# Patient Record
Sex: FEMALE | Race: WHITE | NOT HISPANIC OR LATINO | Employment: FULL TIME | URBAN - METROPOLITAN AREA
[De-identification: names, ages, dates, MRNs, and addresses within clinical notes are randomized per-mention and may not be internally consistent; named-entity substitution may affect disease eponyms.]

---

## 2020-10-05 ENCOUNTER — OFFICE VISIT (OUTPATIENT)
Dept: FAMILY MEDICINE CLINIC | Facility: CLINIC | Age: 25
End: 2020-10-05

## 2020-10-05 VITALS
HEART RATE: 70 BPM | DIASTOLIC BLOOD PRESSURE: 58 MMHG | SYSTOLIC BLOOD PRESSURE: 102 MMHG | WEIGHT: 125 LBS | BODY MASS INDEX: 20.09 KG/M2 | HEIGHT: 66 IN | TEMPERATURE: 98 F | OXYGEN SATURATION: 98 % | RESPIRATION RATE: 14 BRPM

## 2020-10-05 DIAGNOSIS — Z00.00 ANNUAL PHYSICAL EXAM: Primary | ICD-10-CM

## 2020-10-05 DIAGNOSIS — Z23 NEED FOR TDAP VACCINATION: ICD-10-CM

## 2020-10-05 PROCEDURE — 90715 TDAP VACCINE 7 YRS/> IM: CPT | Performed by: FAMILY MEDICINE

## 2020-10-05 PROCEDURE — 99385 PREV VISIT NEW AGE 18-39: CPT | Performed by: FAMILY MEDICINE

## 2020-10-05 PROCEDURE — 90471 IMMUNIZATION ADMIN: CPT | Performed by: FAMILY MEDICINE

## 2020-10-05 RX ORDER — DROSPIRENONE AND ETHINYL ESTRADIOL TABLETS 0.02-3(28)
KIT ORAL SEE ADMIN INSTRUCTIONS
COMMUNITY
Start: 2020-08-31 | End: 2020-12-07 | Stop reason: SDUPTHER

## 2020-10-05 NOTE — PROGRESS NOTES
Subjective   Violeta Chin is a 25 y.o. female.   Chief Complaint   Patient presents with   • Establish Care   • TDAP     History of Present Illness   LMP: 9/5/2020, she does take OCP.   Pap completed in 2020, thinks June 5.   Dental exam: overdue   Vision exam: June 2020   She exercises daily and has healthy diet.   She moved from [place]. She is an  at [employer].     The following portions of the patient's history were reviewed and updated as appropriate: allergies, current medications, past family history, past medical history, past social history, past surgical history and problem list.    Review of Systems   Constitutional: Negative for activity change, appetite change and fever.   HENT: Negative.    Respiratory: Negative.    Cardiovascular: Negative.    Endocrine: Negative.    Genitourinary: Negative.    Skin: Negative for rash.   Neurological: Negative.    Psychiatric/Behavioral: Negative.        Objective   Physical Exam  Vitals signs and nursing note reviewed.   Constitutional:       Appearance: She is well-developed.   HENT:      Head: Normocephalic and atraumatic.      Right Ear: Tympanic membrane, ear canal and external ear normal.      Left Ear: Tympanic membrane, ear canal and external ear normal.      Nose: Nose normal.   Eyes:      Conjunctiva/sclera: Conjunctivae normal.   Neck:      Musculoskeletal: Normal range of motion and neck supple.   Cardiovascular:      Rate and Rhythm: Normal rate and regular rhythm.      Heart sounds: Normal heart sounds. No murmur.   Pulmonary:      Effort: Pulmonary effort is normal.      Breath sounds: Normal breath sounds. No wheezing.   Musculoskeletal:         General: No deformity.   Lymphadenopathy:      Cervical: No cervical adenopathy.   Skin:     General: Skin is warm and dry.   Neurological:      General: No focal deficit present.      Mental Status: She is alert and oriented to person, place, and time.   Psychiatric:         Mood and Affect: Mood normal.          Behavior: Behavior normal.         Thought Content: Thought content normal.           Assessment/Plan   Violeta was seen today for establish care and tdap.    Diagnoses and all orders for this visit:    Annual physical exam    Need for Tdap vaccination  -     Tdap Vaccine Greater Than or Equal To 6yo IM      Health advice: healthy food choices with fresh fruits and vegetables, maintain sleep pattern at least 8 hours, avoid texting and distracted driving practices; wear safety belt, engage in regular exercise, maintain healthy weight, use safe sex practices, avoid alcohol and illicit drugs. Maintain immunizations that are up to date. Maintain health maintenance: Pap, etc.  Follow up with PCP if struggling with depression or anxiety. Keep regular dental and eye exams. Brush and floss teeth daily.   F/U annually and prn.

## 2020-12-07 RX ORDER — DROSPIRENONE AND ETHINYL ESTRADIOL TABLETS 0.02-3(28)
1 KIT ORAL SEE ADMIN INSTRUCTIONS
Qty: 84 TABLET | Refills: 3 | Status: SHIPPED | OUTPATIENT
Start: 2020-12-07 | End: 2021-12-09

## 2020-12-07 NOTE — TELEPHONE ENCOUNTER
Caller: Violeta Chin    Relationship: Self    Best call back number:[phone]     Medication needed:   Requested Prescriptions     Pending Prescriptions Disp Refills   • Loryna 3-0.02 MG per tablet       Sig: See Admin Instructions.       When do you need the refill by: WITHIN THREE DAYS    What details did the patient provide when requesting the medication: PATIENT HAS THREE PILLS LEFT    Does the patient have less than a 3 day supply:  [] Yes  [x] No    What is the patient's preferred pharmacy: [place] [address] [phone] [facility] [phone]

## 2021-03-11 RX ORDER — DROSPIRENONE AND ETHINYL ESTRADIOL TABLETS 0.02-3(28)
1 KIT ORAL SEE ADMIN INSTRUCTIONS
Qty: 84 TABLET | Refills: 3 | OUTPATIENT
Start: 2021-03-11

## 2021-09-22 ENCOUNTER — OFFICE VISIT (OUTPATIENT)
Dept: FAMILY MEDICINE CLINIC | Facility: CLINIC | Age: 26
End: 2021-09-22

## 2021-09-22 VITALS
TEMPERATURE: 98.1 F | HEART RATE: 78 BPM | BODY MASS INDEX: 19.29 KG/M2 | SYSTOLIC BLOOD PRESSURE: 96 MMHG | WEIGHT: 120 LBS | DIASTOLIC BLOOD PRESSURE: 60 MMHG | RESPIRATION RATE: 18 BRPM | OXYGEN SATURATION: 99 % | HEIGHT: 66 IN

## 2021-09-22 DIAGNOSIS — Z13.220 SCREENING FOR HYPERLIPIDEMIA: ICD-10-CM

## 2021-09-22 DIAGNOSIS — Z00.00 ANNUAL PHYSICAL EXAM: Primary | ICD-10-CM

## 2021-09-22 PROCEDURE — 99395 PREV VISIT EST AGE 18-39: CPT | Performed by: FAMILY MEDICINE

## 2021-09-22 NOTE — PROGRESS NOTES
"Chief Complaint  Annual Exam no pap (pt is not fasting )    Subjective          Violeta Chin presents to [facility]  History of Present Illness  Here for annual exam  Dental exam is up to date  Vision exam is up to date  Pap smear completed June 2020, plans to establish care with gynecology   Taking OCP to manage painful periods  She is active, tries to do yoga, run, strength training.     The following portions of the patient's history were reviewed and updated as appropriate: allergies, current medications, past family history, past medical history, past social history, past surgical history and problem list.    Objective   Vital Signs:   BP 96/60   Pulse 78   Temp 98.1 °F (36.7 °C)   Resp 18   Ht 167.6 cm (66\")   Wt 54.4 kg (120 lb)   SpO2 99%   BMI 19.37 kg/m²     Physical Exam  Vitals and nursing note reviewed.   Constitutional:       Appearance: Normal appearance. She is well-developed.   HENT:      Head: Normocephalic and atraumatic.      Right Ear: External ear normal.      Left Ear: External ear normal.      Nose: Nose normal.   Eyes:      Conjunctiva/sclera: Conjunctivae normal.   Cardiovascular:      Rate and Rhythm: Normal rate and regular rhythm.      Heart sounds: Normal heart sounds. No murmur heard.     Pulmonary:      Effort: Pulmonary effort is normal.      Breath sounds: Normal breath sounds.   Musculoskeletal:         General: No deformity.      Cervical back: Neck supple.   Skin:     General: Skin is warm and dry.   Neurological:      General: No focal deficit present.      Mental Status: She is alert and oriented to person, place, and time.   Psychiatric:         Mood and Affect: Mood normal.         Behavior: Behavior normal.         Thought Content: Thought content normal.        Result Review :                 Assessment and Plan    Diagnoses and all orders for this visit:    1. Annual physical exam (Primary)  -     CBC & Differential; Future  -     " Comprehensive Metabolic Panel; Future  -     Lipid Panel With / Chol / HDL Ratio; Future    2. Screening for hyperlipidemia  -     CBC & Differential; Future  -     Comprehensive Metabolic Panel; Future  -     Lipid Panel With / Chol / HDL Ratio; Future    Health advice: healthy food choices with fresh fruits and vegetables, maintain sleep pattern at least 8 hours, avoid texting and distracted driving practices; wear safety belt, engage in regular exercise, maintain healthy weight, use safe sex practices, avoid alcohol and illicit drugs. Maintain immunizations that are up to date. Maintain health maintenance:  Pap, etc.  Follow up with PCP if struggling with depression or anxiety. Keep regular dental and eye exams. Brush and floss teeth daily.   F/U annually and prn.       Follow Up   Return in about 1 year (around 9/22/2022) for Annual.  Patient was given instructions and counseling regarding her condition or for health maintenance advice. Please see specific information pulled into the AVS if appropriate.

## 2021-09-22 NOTE — PATIENT INSTRUCTIONS
Preventive Care 21-39 Years Old, Female  Preventive care refers to lifestyle choices and visits with your health care provider that can promote health and wellness. This includes:  · A yearly physical exam. This is also called an annual wellness visit.  · Regular dental and eye exams.  · Immunizations.  · Screening for certain conditions.  · Healthy lifestyle choices, such as:  ? Eating a healthy diet.  ? Getting regular exercise.  ? Not using drugs or products that contain nicotine and tobacco.  ? Limiting alcohol use.  What can I expect for my preventive care visit?  Physical exam  Your health care provider may check your:  · Height and weight. These may be used to calculate your BMI (body mass index). BMI is a measurement that tells if you are at a healthy weight.  · Heart rate and blood pressure.  · Body temperature.  · Skin for abnormal spots.  Counseling  Your health care provider may ask you questions about your:  · Past medical problems.  · Family's medical history.  · Alcohol, tobacco, and drug use.  · Emotional well-being.  · Home life and relationship well-being.  · Sexual activity.  · Diet, exercise, and sleep habits.  · Work and work environment.  · Access to firearms.  · Method of birth control.  · Menstrual cycle.  · Pregnancy history.  What immunizations do I need?    Vaccines are usually given at various ages, according to a schedule. Your health care provider will recommend vaccines for you based on your age, medical history, and lifestyle or other factors, such as travel or where you work.  What tests do I need?    Blood tests  · Lipid and cholesterol levels. These may be checked every 5 years starting at age 20.  · Hepatitis C test.  · Hepatitis B test.  Screening  · Diabetes screening. This is done by checking your blood sugar (glucose) after you have not eaten for a while (fasting).  · STD (sexually transmitted disease) testing, if you are at risk.  · BRCA-related cancer screening. This may be  done if you have a family history of breast, ovarian, tubal, or peritoneal cancers.  · Pelvic exam and Pap test. This may be done every 3 years starting at age 21. Starting at age 30, this may be done every 5 years if you have a Pap test in combination with an HPV test.  Talk with your health care provider about your test results, treatment options, and if necessary, the need for more tests.  Follow these instructions at home:  Eating and drinking    · Eat a healthy diet that includes fresh fruits and vegetables, whole grains, lean protein, and low-fat dairy products.  · Take vitamin and mineral supplements as recommended by your health care provider.  · Do not drink alcohol if:  ? Your health care provider tells you not to drink.  ? You are pregnant, may be pregnant, or are planning to become pregnant.  · If you drink alcohol:  ? Limit how much you have to 0-1 drink a day.  ? Be aware of how much alcohol is in your drink. In the [place] one drink equals one 12 oz bottle of beer (355 mL), one 5 oz glass of wine (148 mL), or one 1½ oz glass of hard liquor (44 mL).    Lifestyle  · Take daily care of your teeth and gums. Brush your teeth every morning and night with fluoride toothpaste. Floss one time each day.  · Stay active. Exercise for at least 30 minutes 5 or more days each week.  · Do not use any products that contain nicotine or tobacco, such as cigarettes, e-cigarettes, and chewing tobacco. If you need help quitting, ask your health care provider.  · Do not use drugs.  · If you are sexually active, practice safe sex. Use a condom or other form of birth control (contraception) in order to prevent pregnancy and STIs (sexually transmitted infections). If you plan to become pregnant, see your health care provider for a pre-conception visit.  · Find healthy ways to cope with stress, such as:  ? Meditation, yoga, or listening to music.  ? Journaling.  ? Talking to a trusted person.  ? Spending time with friends and  family.  Safety  · Always wear your seat belt while driving or riding in a vehicle.  · Do not drive:  ? If you have been drinking alcohol. Do not ride with someone who has been drinking.  ? When you are tired or distracted.  ? While texting.  · Wear a helmet and other protective equipment during sports activities.  · If you have firearms in your house, make sure you follow all gun safety procedures.  · Seek help if you have been physically or sexually abused.  What's next?  · Go to your health care provider once a year for an annual wellness visit.  · Ask your health care provider how often you should have your eyes and teeth checked.  · Stay up to date on all vaccines.  This information is not intended to replace advice given to you by your health care provider. Make sure you discuss any questions you have with your health care provider.  Document Revised: 09/02/2020 Document Reviewed: 08/29/2019  [employer] Patient Education © 2021 [employer]

## 2021-09-30 ENCOUNTER — LAB (OUTPATIENT)
Dept: FAMILY MEDICINE CLINIC | Facility: CLINIC | Age: 26
End: 2021-09-30

## 2021-09-30 DIAGNOSIS — Z13.220 SCREENING FOR HYPERLIPIDEMIA: ICD-10-CM

## 2021-09-30 DIAGNOSIS — Z00.00 ANNUAL PHYSICAL EXAM: ICD-10-CM

## 2021-10-01 LAB
ALBUMIN SERPL-MCNC: 4.4 G/DL (ref 3.5–5.2)
ALBUMIN/GLOB SERPL: 1.8 G/DL
ALP SERPL-CCNC: 36 U/L (ref 39–117)
ALT SERPL-CCNC: 13 U/L (ref 1–33)
AST SERPL-CCNC: 16 U/L (ref 1–32)
BASOPHILS # BLD AUTO: 0.03 10*3/MM3 (ref 0–0.2)
BASOPHILS NFR BLD AUTO: 0.6 % (ref 0–1.5)
BILIRUB SERPL-MCNC: 0.3 MG/DL (ref 0–1.2)
BUN SERPL-MCNC: 16 MG/DL (ref 6–20)
BUN/CREAT SERPL: 20 (ref 7–25)
CALCIUM SERPL-MCNC: 9.6 MG/DL (ref 8.6–10.5)
CHLORIDE SERPL-SCNC: 106 MMOL/L (ref 98–107)
CHOLEST SERPL-MCNC: 178 MG/DL (ref 0–200)
CHOLEST/HDLC SERPL: 3.18 {RATIO}
CO2 SERPL-SCNC: 24.7 MMOL/L (ref 22–29)
CREAT SERPL-MCNC: 0.8 MG/DL (ref 0.57–1)
EOSINOPHIL # BLD AUTO: 0.09 10*3/MM3 (ref 0–0.4)
EOSINOPHIL NFR BLD AUTO: 1.7 % (ref 0.3–6.2)
ERYTHROCYTE [DISTWIDTH] IN BLOOD BY AUTOMATED COUNT: 12.3 % (ref 12.3–15.4)
GLOBULIN SER CALC-MCNC: 2.4 GM/DL
GLUCOSE SERPL-MCNC: 80 MG/DL (ref 65–99)
HCT VFR BLD AUTO: 40.2 % (ref 34–46.6)
HDLC SERPL-MCNC: 56 MG/DL (ref 40–60)
HGB BLD-MCNC: 13.2 G/DL (ref 12–15.9)
IMM GRANULOCYTES # BLD AUTO: 0.01 10*3/MM3 (ref 0–0.05)
IMM GRANULOCYTES NFR BLD AUTO: 0.2 % (ref 0–0.5)
LDLC SERPL CALC-MCNC: 101 MG/DL (ref 0–100)
LYMPHOCYTES # BLD AUTO: 1.82 10*3/MM3 (ref 0.7–3.1)
LYMPHOCYTES NFR BLD AUTO: 34.8 % (ref 19.6–45.3)
MCH RBC QN AUTO: 30.6 PG (ref 26.6–33)
MCHC RBC AUTO-ENTMCNC: 32.8 G/DL (ref 31.5–35.7)
MCV RBC AUTO: 93.3 FL (ref 79–97)
MONOCYTES # BLD AUTO: 0.41 10*3/MM3 (ref 0.1–0.9)
MONOCYTES NFR BLD AUTO: 7.8 % (ref 5–12)
NEUTROPHILS # BLD AUTO: 2.87 10*3/MM3 (ref 1.7–7)
NEUTROPHILS NFR BLD AUTO: 54.9 % (ref 42.7–76)
NRBC BLD AUTO-RTO: 0 /100 WBC (ref 0–0.2)
PLATELET # BLD AUTO: 216 10*3/MM3 (ref 140–450)
POTASSIUM SERPL-SCNC: 4.8 MMOL/L (ref 3.5–5.2)
PROT SERPL-MCNC: 6.8 G/DL (ref 6–8.5)
RBC # BLD AUTO: 4.31 10*6/MM3 (ref 3.77–5.28)
SODIUM SERPL-SCNC: 140 MMOL/L (ref 136–145)
TRIGL SERPL-MCNC: 115 MG/DL (ref 0–150)
VLDLC SERPL CALC-MCNC: 21 MG/DL (ref 5–40)
WBC # BLD AUTO: 5.23 10*3/MM3 (ref 3.4–10.8)

## 2021-12-09 RX ORDER — DROSPIRENONE AND ETHINYL ESTRADIOL TABLETS 0.02-3(28)
KIT ORAL
Qty: 84 TABLET | Refills: 0 | Status: SHIPPED | OUTPATIENT
Start: 2021-12-09 | End: 2022-03-14

## 2022-03-14 RX ORDER — DROSPIRENONE AND ETHINYL ESTRADIOL TABLETS 0.02-3(28)
KIT ORAL
Qty: 84 TABLET | Refills: 0 | Status: SHIPPED | OUTPATIENT
Start: 2022-03-14 | End: 2022-06-24

## 2022-06-09 ENCOUNTER — TELEPHONE (OUTPATIENT)
Dept: FAMILY MEDICINE CLINIC | Facility: CLINIC | Age: 27
End: 2022-06-09

## 2022-06-09 NOTE — TELEPHONE ENCOUNTER
Caller: Violeta Chin    Relationship to patient: Self    Best call back number: [phone]     Chief complaint: RASH ON FACE AND BODY    Type of visit: OFFICE    Requested date: AS SOON AS POSSIBLE    Additional note PATIENT IS WILLING TO SEE ANYONE.

## 2022-06-24 RX ORDER — DROSPIRENONE AND ETHINYL ESTRADIOL TABLETS 0.02-3(28)
KIT ORAL
Qty: 84 TABLET | Refills: 0 | Status: SHIPPED | OUTPATIENT
Start: 2022-06-24 | End: 2022-07-13 | Stop reason: SDUPTHER

## 2022-07-13 ENCOUNTER — OFFICE VISIT (OUTPATIENT)
Dept: OBSTETRICS AND GYNECOLOGY | Facility: CLINIC | Age: 27
End: 2022-07-13

## 2022-07-13 VITALS
SYSTOLIC BLOOD PRESSURE: 114 MMHG | DIASTOLIC BLOOD PRESSURE: 82 MMHG | BODY MASS INDEX: 20.76 KG/M2 | HEIGHT: 66 IN | WEIGHT: 129.2 LBS

## 2022-07-13 DIAGNOSIS — Z30.41 ENCOUNTER FOR SURVEILLANCE OF CONTRACEPTIVE PILLS: ICD-10-CM

## 2022-07-13 DIAGNOSIS — Z01.419 WOMEN'S ANNUAL ROUTINE GYNECOLOGICAL EXAMINATION: Primary | ICD-10-CM

## 2022-07-13 PROCEDURE — 99385 PREV VISIT NEW AGE 18-39: CPT | Performed by: OBSTETRICS & GYNECOLOGY

## 2022-07-13 RX ORDER — DROSPIRENONE AND ETHINYL ESTRADIOL TABLETS 0.02-3(28)
1 KIT ORAL DAILY
Qty: 84 TABLET | Refills: 3 | Status: SHIPPED | OUTPATIENT
Start: 2022-07-13 | End: 2022-12-02

## 2022-07-13 NOTE — PROGRESS NOTES
GYN Annual Exam     CC - Here for annual exam.        HPI  Violeta Chin is a 27 y.o. female, , who presents for annual well woman exam and to establish care. Patient's last menstrual period was 2022. Periods are regular every 25-35 days, lasting 4-5 days. .  Dysmenorrhea:none.  Patient reports problems with: none. Partner Status: Marital Status: .  She is sexually active. She has not had new partners since her last STD testing. She does desire STD testing. STD testing recommendations have been explained to the patient. STD testing recommendations have been explained to the patient and she does desire STD testing.    Additional OB/GYN History   Current contraception: contraceptive methods: OCP (estrogen/progesterone)  Desires to: continue contraception  Last Pap : 2020. Result: Normal per patient - Completed in [place]   Last Completed Pap Smear          Ordered - PAP SMEAR (Every 3 Years) Ordered on 2020  Patient-Reported (Performed Externally)              History of abnormal Pap smear: no  Family history of uterine, colon, breast, or ovarian cancer: no  Performs monthly Self-Breast Exam: no  Exercises Regularly:yes  Feelings of Anxiety or Depression: no  Tobacco Usage?: No   OB History        0    Para   0    Term   0       0    AB   0    Living   0       SAB   0    IAB   0    Ectopic   0    Molar   0    Multiple   0    Live Births   0                Health Maintenance   Topic Date Due   • Annual Gynecologic Pelvic and Breast Exam  Never done   • HEPATITIS C SCREENING  Never done   • ANNUAL PHYSICAL  2022   • INFLUENZA VACCINE  10/01/2022   • PAP SMEAR  2023   • TDAP/TD VACCINES (2 - Td or Tdap) 10/05/2030   • COVID-19 Vaccine  Completed   • Pneumococcal Vaccine 0-64  Aged Out       The additional following portions of the patient's history were reviewed and updated as appropriate: allergies, current medications, past family history, past  "medical history, past social history and past surgical history.    Review of Systems   Constitutional: Negative for fever, unexpected weight gain and unexpected weight loss.   Eyes: Negative for visual disturbance.   Respiratory: Negative for cough and shortness of breath.    Cardiovascular: Negative for chest pain.   Gastrointestinal: Negative for abdominal pain, blood in stool, constipation and diarrhea.   Endocrine: Negative for cold intolerance and heat intolerance.   Genitourinary: Negative for amenorrhea, breast discharge, breast lump, dyspareunia, menstrual problem and vaginal pain.   Musculoskeletal: Negative for back pain and myalgias.   Skin: Negative for dry skin and skin lesions.   Neurological: Negative for headache.   Psychiatric/Behavioral: Negative for suicidal ideas and depressed mood.         I have reviewed and agree with the HPI, ROS, and historical information as entered above. Mary Crabtree MD    Objective   /82 (BP Location: Left arm, Patient Position: Sitting, Cuff Size: Adult)   Ht 167.6 cm (66\")   Wt 58.6 kg (129 lb 3.2 oz)   LMP 06/19/2022   BMI 20.85 kg/m²     Physical Exam  Vitals and nursing note reviewed. Exam conducted with a chaperone present.   Constitutional:       Appearance: She is well-developed.   HENT:      Head: Normocephalic and atraumatic.   Eyes:      Pupils: Pupils are equal, round, and reactive to light.   Neck:      Thyroid: No thyroid mass or thyromegaly.   Pulmonary:      Effort: Pulmonary effort is normal. No retractions.   Chest:      Chest wall: No mass.   Breasts:      Right: Normal. No mass, nipple discharge, skin change or tenderness.      Left: Normal. No mass, nipple discharge, skin change or tenderness.       Abdominal:      General: Bowel sounds are normal.      Palpations: Abdomen is soft. Abdomen is not rigid. There is no mass.      Tenderness: There is no abdominal tenderness. There is no guarding.      Hernia: No hernia is present. There is no " hernia in the left inguinal area or right inguinal area.   Genitourinary:     Exam position: Lithotomy position.      Pubic Area: No rash.       Labia:         Right: No rash, tenderness or lesion.         Left: No rash, tenderness or lesion.       Urethra: No urethral pain or urethral swelling.      Vagina: Normal. No vaginal discharge or lesions.      Cervix: No cervical motion tenderness, discharge, lesion or cervical bleeding.      Uterus: Normal. Not enlarged, not fixed and not tender.       Adnexa:         Right: No mass, tenderness or fullness.          Left: No mass, tenderness or fullness.        Rectum: No external hemorrhoid.   Musculoskeletal:      Cervical back: Normal range of motion. No muscular tenderness.      Right lower leg: No edema.      Left lower leg: No edema.   Skin:     General: Skin is warm and dry.   Neurological:      Mental Status: She is alert and oriented to person, place, and time.      Motor: Motor function is intact.   Psychiatric:         Mood and Affect: Mood and affect normal.         Behavior: Behavior normal.            Assessment and Plan    Problem List Items Addressed This Visit    None     Visit Diagnoses     Women's annual routine gynecological examination    -  Primary    Relevant Orders    LIQUID-BASED PAP SMEAR, [facility] ([facility])    Encounter for surveillance of contraceptive pills              1. GYN annual well woman exam.   2. Encouraged use of condoms for STD prevention.  3. OCP's/Vaginal Ring - Discussed side effects of nausea, BTB, headaches, breast tenderness and slight weight gain in the first three cycles.  Understands risks of blood clots, stroke, and theoretical risk of breast cancer.  Denies family history of blood clots.  4. Reviewed monthly self breast exams.  Instructed to call with lumps, pain, or breast discharge.    5. Reviewed exercise as a preventative health measures.   6. Reccommended Flu Vaccine in Fall of each year.  7. RTC in 1 year or PRN  with problems  Return in about 1 year (around 7/13/2023) for Annual physical.      Mary Crabtree MD  07/13/2022

## 2022-07-19 LAB — REF LAB TEST METHOD: NORMAL

## 2022-08-30 ENCOUNTER — TELEPHONE (OUTPATIENT)
Dept: FAMILY MEDICINE CLINIC | Facility: CLINIC | Age: 27
End: 2022-08-30

## 2022-08-30 DIAGNOSIS — Z87.828 HISTORY OF TORN MENISCUS OF LEFT KNEE: Primary | ICD-10-CM

## 2022-12-02 ENCOUNTER — OFFICE VISIT (OUTPATIENT)
Dept: FAMILY MEDICINE CLINIC | Facility: CLINIC | Age: 27
End: 2022-12-02

## 2022-12-02 VITALS
OXYGEN SATURATION: 99 % | SYSTOLIC BLOOD PRESSURE: 96 MMHG | HEART RATE: 54 BPM | DIASTOLIC BLOOD PRESSURE: 58 MMHG | WEIGHT: 132 LBS | HEIGHT: 66 IN | BODY MASS INDEX: 21.21 KG/M2 | RESPIRATION RATE: 12 BRPM | TEMPERATURE: 97.8 F

## 2022-12-02 DIAGNOSIS — D22.9 NEVUS: ICD-10-CM

## 2022-12-02 DIAGNOSIS — Z13.220 SCREENING FOR HYPERLIPIDEMIA: ICD-10-CM

## 2022-12-02 DIAGNOSIS — Z00.00 ANNUAL PHYSICAL EXAM: Primary | ICD-10-CM

## 2022-12-02 PROCEDURE — 99395 PREV VISIT EST AGE 18-39: CPT | Performed by: FAMILY MEDICINE

## 2022-12-02 NOTE — PROGRESS NOTES
Chief Complaint  Annual Exam (No pap )    Subjective          Violeta Chin presents to [facility]  History of Present Illness    Violeta Chin is a 27-year-old female who presents for an annual exam.    Health update:  The patient had left knee surgery approximately 2 months ago performed by Dr. Sue at [facility]. The patient's meniscus was torn while playing flag football in 07/2022. She is not able to exercise routinely due to this currently. She is attempting to create a new activity routine.    Allergies:  The patient believes that she is allergic to PISTACHIOS. She experienced hives and swelling over her entire body for approximately 1 week. Her throat did not swell. She presented to urgent care and she was prescribed steroids. She ate pistachios once more following this episode, and her face began feeling itchy. She now avoids the food.    Health maintenance:  The patient is up-to-date on her dental exam. She is not up-to-date on her vision exam. She states that her vision is stable. The patient's pap smear is updated. The patient has not been administered her influenza vaccine, and she does not want it currently.    Skin changes:  The patient states that she has noticed an increase in size of a mole on her right ear. She states that it is not raised. The patient does not currently see a dermatologist.     The following portions of the patient's history were reviewed and updated as appropriate: allergies, current medications, past family history, past medical history, past social history, past surgical history and problem list.    Objective      Physical Exam  Vitals reviewed.   Constitutional:       Appearance: Normal appearance. She is well-developed.   HENT:      Head: Normocephalic and atraumatic.        Comments: Right ear helix flat, light brown nevus, less than pencil eraser size     Right Ear: Tympanic membrane, ear canal and external ear normal.       Left Ear: Tympanic membrane, ear canal and external ear normal.   Cardiovascular:      Rate and Rhythm: Normal rate and regular rhythm.      Heart sounds: Normal heart sounds. No murmur heard.  Pulmonary:      Effort: Pulmonary effort is normal.      Breath sounds: Normal breath sounds.   Musculoskeletal:         General: No swelling.      Cervical back: Neck supple.   Neurological:      Mental Status: She is alert and oriented to person, place, and time.   Psychiatric:         Behavior: Behavior normal.        Result Review :                Assessment and Plan    Diagnoses and all orders for this visit:    1. Annual physical exam (Primary)  -     CBC & Differential; Future  -     Comprehensive Metabolic Panel; Future    2. Nevus    3. Screening for hyperlipidemia  -     Lipid Panel With / Chol / HDL Ratio; Future      Skin changes:  - The patient will monitor for skin changes. She was advised to monitor for border changes and size change in moles as well.  - The patient will schedule an appointment with a dermatologist. She will make contact if a referral is needed.    Vaccination:  - The patient will make contact if she would like to receive the influenza vaccine. She is also able to have this done at a pharmacy.    Health maintenance:  - The patient had blood work done in 09/2021. Her low-density lipoprotein was 101 mg/dL. Her electrolytes, kidney function, and complete blood count were normal.  - The patient will have blood work done.  - The patient will follow-up in 1 year.  - Maintain routine vaccines.  Stay up-to-date on routine dental and vision exams.  Complete Pap smear when due.      Follow Up   Return in about 1 year (around 12/4/2023) for Annual.  Patient was given instructions and counseling regarding her condition or for health maintenance advice. Please see specific information pulled into the AVS if appropriate.     Transcribed from ambient dictation for Sangeeta Posada DO by Leah  Ravin.  12/02/22   17:59 EST    Patient or patient representative verbalized consent to the visit recording.  I have personally performed the services described in this document as transcribed by the above individual, and it is both accurate and complete.  Sangeeta Posada DO  12/2/2022  19:34 EST

## 2023-06-02 ENCOUNTER — TELEPHONE (OUTPATIENT)
Dept: OBSTETRICS AND GYNECOLOGY | Facility: CLINIC | Age: 28
End: 2023-06-02

## 2023-06-02 NOTE — TELEPHONE ENCOUNTER
PROVIDER: DR. MAC    Caller: Violeta Chin    Relationship: Self    Best call back number: [phone]    Requested Prescriptions:   Requested Prescriptions      No prescriptions requested or ordered in this encounter      [facility]    Pharmacy where request should be sent:  [facility] [address]        Last office visit with prescribing clinician: 7/13/2022   Last telemedicine visit with prescribing clinician: Visit date not found   Next office visit with prescribing clinician: 8/9/2023     Additional details provided by patient: PT NOW HAS APPT ON 8-9 WANTS TO KNOW IF SHE CAN GET EXTENSION ON BIRTH CONTROL, SHE IS TOTALLY OUT OF MED    Does the patient have less than a 3 day supply:  [x] Yes  [] No    Would you like a call back once the refill request has been completed: [x] Yes [] No    If the office needs to give you a call back, can they leave a voicemail: [x] Yes [] No    Shanna Dumont Rep   06/02/23 09:36 EDT

## 2023-08-09 ENCOUNTER — OFFICE VISIT (OUTPATIENT)
Dept: OBSTETRICS AND GYNECOLOGY | Facility: CLINIC | Age: 28
End: 2023-08-09
Payer: COMMERCIAL

## 2023-08-09 VITALS
HEIGHT: 66 IN | WEIGHT: 132.2 LBS | SYSTOLIC BLOOD PRESSURE: 110 MMHG | BODY MASS INDEX: 21.24 KG/M2 | DIASTOLIC BLOOD PRESSURE: 62 MMHG

## 2023-08-09 DIAGNOSIS — Z01.419 WOMEN'S ANNUAL ROUTINE GYNECOLOGICAL EXAMINATION: ICD-10-CM

## 2023-08-09 DIAGNOSIS — Z30.41 ENCOUNTER FOR SURVEILLANCE OF CONTRACEPTIVE PILLS: Primary | ICD-10-CM

## 2023-08-09 RX ORDER — DROSPIRENONE AND ETHINYL ESTRADIOL 0.02-3(28)
1 KIT ORAL DAILY
Qty: 90 TABLET | Refills: 3 | Status: SHIPPED | OUTPATIENT
Start: 2023-08-09

## 2023-08-09 NOTE — PROGRESS NOTES
Gynecologic Annual Exam Note        Annual Exam        Subjective     HPI  Violeta Chin is a 28 y.o.  female who presents for annual well woman exam as a established patient. .LMP 2023. Her periods are regular x 4-5 days with light to medium flow. She reports dysmenorrhea is none. Partner Status: Marital Status: .  She is sexually active. She has not had new partners.. STD testing recommendations have been explained to the patient and she does not desire STD testing.    She hasn't taken OCP's for about a year but would like to restart Loryna.  She thinks they may try to conceive in about a year.    Additional OB/GYN History   Current contraception: contraceptive methods: Condoms  Desires to: start contraception  Thromboembolic Disease: none      Last Pap : . Results: negative. HPV: negative.   Last Completed Pap Smear            PAP SMEAR (Every 3 Years) Next due on 2022  LIQUID-BASED PAP SMEAR, [facility] ([facility])    2020  Patient-Reported (Performed Externally)                     History of abnormal Pap smear: no  Gardasil status:did not receive, information given to patient  Family history of uterine, colon, breast, or ovarian cancer: no  Performs monthly Self-Breast Exam: yes  Exercises Regularly:yes  Feelings of Anxiety or Depression: no  Tobacco Usage?: No       Current Outpatient Medications:     drospirenone-ethinyl estradiol (Loryna) 3-0.02 MG per tablet, Take 1 tablet by mouth Daily., Disp: 90 tablet, Rfl: 3         OB History          0    Para   0    Term   0       0    AB   0    Living   0         SAB   0    IAB   0    Ectopic   0    Molar   0    Multiple   0    Live Births   0                Health Maintenance   Topic Date Due    HEPATITIS C SCREENING  Never done    COVID-19 Vaccine (4 - Pfizer series) 2022    Annual Gynecologic Pelvic and Breast Exam  2023    INFLUENZA VACCINE  10/01/2023    ANNUAL PHYSICAL   "12/02/2023    PAP SMEAR  07/13/2025    TDAP/TD VACCINES (2 - Td or Tdap) 10/05/2030    Pneumococcal Vaccine 0-64  Aged Out       History reviewed. No pertinent past medical history.     Past Surgical History:   Procedure Laterality Date    KNEE SURGERY Left     BGO       The additional following portions of the patient's history were reviewed and updated as appropriate: allergies, current medications, past family history, past medical history, past social history, past surgical history, and problem list.    Review of Systems      I have reviewed and agree with the HPI, ROS, and historical information as entered above. Mary Crabtree MD          Objective   /62   Ht 167.6 cm (66\")   Wt 60 kg (132 lb 3.2 oz)   LMP 07/07/2023   BMI 21.34 kg/mý     Physical Exam  Vitals and nursing note reviewed. Exam conducted with a chaperone present.   Constitutional:       Appearance: She is well-developed.   HENT:      Head: Normocephalic and atraumatic.   Eyes:      Pupils: Pupils are equal, round, and reactive to light.   Neck:      Thyroid: No thyroid mass or thyromegaly.   Pulmonary:      Effort: Pulmonary effort is normal. No retractions.   Chest:      Chest wall: No mass.   Breasts:     Right: Normal. No mass, nipple discharge, skin change or tenderness.      Left: Normal. No mass, nipple discharge, skin change or tenderness.   Abdominal:      General: Bowel sounds are normal.      Palpations: Abdomen is soft. Abdomen is not rigid. There is no mass.      Tenderness: There is no abdominal tenderness. There is no guarding.      Hernia: No hernia is present. There is no hernia in the left inguinal area or right inguinal area.   Genitourinary:     Exam position: Lithotomy position.      Pubic Area: No rash.       Labia:         Right: No rash, tenderness or lesion.         Left: No rash, tenderness or lesion.       Urethra: No urethral pain or urethral swelling.      Vagina: Normal. No vaginal discharge or lesions.      " Cervix: No cervical motion tenderness, discharge, lesion or cervical bleeding.      Uterus: Normal. Not enlarged, not fixed and not tender.       Adnexa:         Right: No mass, tenderness or fullness.          Left: No mass, tenderness or fullness.        Rectum: No external hemorrhoid.   Musculoskeletal:      Cervical back: Normal range of motion. No muscular tenderness.      Right lower leg: No edema.      Left lower leg: No edema.   Skin:     General: Skin is warm and dry.   Neurological:      Mental Status: She is alert and oriented to person, place, and time.      Motor: Motor function is intact.   Psychiatric:         Mood and Affect: Mood and affect normal.         Behavior: Behavior normal.          Assessment and Plan    Problem List Items Addressed This Visit    None  Visit Diagnoses       Encounter for surveillance of contraceptive pills    -  Primary    Relevant Medications    drospirenone-ethinyl estradiol (Loryna) 3-0.02 MG per tablet    Women's annual routine gynecological examination                GYN annual well woman exam.   Reviewed pap guidelines.   OCP's/Vaginal Ring - Discussed side effects of nausea, BTB, headaches, breast tenderness and slight weight gain in the first three cycles.  Understands risks of blood clots, stroke, and theoretical risk of breast cancer.  Denies family history of blood clots.  Reviewed monthly self breast exams.  Instructed to call with lumps, pain, or breast discharge.    Reviewed exercise as a preventative health measures.   Reccommended Flu Vaccine in Fall of each year.  RTC in 1 year or PRN with problems  Return in about 1 year (around 8/9/2024) for Annual physical.      Mary Crabtree MD  08/09/2023

## 2023-09-13 ENCOUNTER — INITIAL PRENATAL (OUTPATIENT)
Dept: OBSTETRICS AND GYNECOLOGY | Facility: CLINIC | Age: 28
End: 2023-09-13
Payer: COMMERCIAL

## 2023-09-13 VITALS — WEIGHT: 133 LBS | DIASTOLIC BLOOD PRESSURE: 60 MMHG | SYSTOLIC BLOOD PRESSURE: 100 MMHG | BODY MASS INDEX: 21.47 KG/M2

## 2023-09-13 DIAGNOSIS — Z34.01 ENCOUNTER FOR SUPERVISION OF NORMAL FIRST PREGNANCY IN FIRST TRIMESTER: Primary | ICD-10-CM

## 2023-09-13 DIAGNOSIS — Z3A.09 9 WEEKS GESTATION OF PREGNANCY: ICD-10-CM

## 2023-09-13 PROBLEM — Z34.90 PREGNANCY: Status: ACTIVE | Noted: 2023-09-13

## 2023-09-13 PROCEDURE — 0501F PRENATAL FLOW SHEET: CPT | Performed by: OBSTETRICS & GYNECOLOGY

## 2023-09-13 NOTE — PROGRESS NOTES
Initial ob visit     CC- Here for care of pregnancy        Violeta Chin is a 28 y.o. female, , who presents for her first obstetrical visit.   Patient's last menstrual period was 2023.. Her ABHINAV is 2024, by Last Menstrual Period. Current GA is 9w5d.     Initial positive test date : 2023, UPT        Her periods are: every 4 weeks  Patient's past medical history is significant for: n/a.  Family history of genetic issues (includes FOB): no  Prior infections concerning in pregnancy (Rash, fever in last 2 weeks): No  Varicella Hx - vaccinated  Prior testing for Cystic Fibrosis Carrier or Sickle Cell Trait- No  History of STD: no  Hx of HSV for patient or partner: no  Ultrasound Today: yes; viable iup emmanuel. 9w5d     OB History    Para Term  AB Living   1 0 0 0 0 0   SAB IAB Ectopic Molar Multiple Live Births   0 0 0 0 0 0      # Outcome Date GA Lbr John Paul/2nd Weight Sex Delivery Anes PTL Lv   1 Current                Additional Pertinent History   Last Pap :  Result: negative HPV: negative     Last Completed Pap Smear            PAP SMEAR (Every 3 Years) Next due on 2022  LIQUID-BASED PAP SMEAR, [facility] ([facility])    2020  Patient-Reported (Performed Externally)                  History of abnormal Pap smear: no  Family history of uterine, colon, breast, or ovarian cancer: no  Feelings of Anxiety or Depression: no  Tobacco Usage?: No   Alcohol/Drug Use?: NO  Over the age of 35 at delivery: no  Desires Genetic Screening: options discussed  Flu Status: Desires at future appt or Will get at work/pharmacy/other facility     PMH    Current Outpatient Medications:     Prenatal Vit-Fe Fumarate-FA (PRENATAL VITAMIN PO), Take  by mouth., Disp: , Rfl:      History reviewed. No pertinent past medical history.     Past Surgical History:   Procedure Laterality Date    KNEE SURGERY Left     BGO       Review of Systems   Review of Systems    Patient Denies: Nausea  and vomiting  All systems reviewed and otherwise normal.    I have reviewed and agree with the HPI, ROS, and historical information as entered above. Mary Crabtree MD      /60   Wt 60.3 kg (133 lb)   LMP 07/07/2023   BMI 21.47 kg/m²     The additional following portions of the patient's history were reviewed and updated as appropriate: allergies, current medications, past family history, past medical history, past social history, past surgical history, and problem list.    Physical Exam  General:  well developed; well nourished  no acute distress   Chest/Respiratory: No labored breathing, normal respiratory effort, normal appearance, no respiratory noises noted   Heart:  not examined   Thyroid: not examined   Breasts:  Not performed.   Abdomen: soft, non-tender; no masses   Pelvis: Not performed.        Assessment and Plan    Problem List Items Addressed This Visit       Pregnancy     Other Visit Diagnoses       Encounter for supervision of normal first pregnancy in first trimester    -  Primary    Relevant Orders    Obstetric Panel    HIV-1 / O / 2 Ag / Antibody    Urine Culture - Urine, Urine, Clean Catch    [id number] PLUS Core+SCA+ESS - Blood,            Pregnancy at 9w5d  Reviewed routine prenatal care with the office and educational materials given  Lab(s) Ordered  Discussed options for genetic testing including first trimester nuchal translucency screen, genetic disease carrier testing, quadruple screen, and NIPT  Return in about 1 month (around 10/13/2023) for F/U Prenatal.      Mary Crabtree MD  09/13/2023

## 2023-09-15 LAB
BACTERIA UR CULT: NO GROWTH
BACTERIA UR CULT: NORMAL

## 2023-09-17 LAB
5P15 DELETION (CRI-DU-CHAT): NOT DETECTED
ABO GROUP BLD: NORMAL
BASOPHILS # BLD AUTO: 0 X10E3/UL (ref 0–0.2)
BASOPHILS NFR BLD AUTO: 1 %
BLD GP AB SCN SERPL QL: NEGATIVE
CFDNA.FET/CFDNA.TOTAL SFR FETUS: NORMAL %
CITATION REF LAB TEST: NORMAL
EOSINOPHIL # BLD AUTO: 0.1 X10E3/UL (ref 0–0.4)
EOSINOPHIL NFR BLD AUTO: 1 %
ERYTHROCYTE [DISTWIDTH] IN BLOOD BY AUTOMATED COUNT: 12.5 % (ref 11.7–15.4)
FET 13+18+21+X+Y ANEUP PLAS.CFDNA: NEGATIVE
FET 1P36 DEL RISK WBC.DNA+CFDNA QL: NOT DETECTED
FET 22Q11.2 DEL RISK WBC.DNA+CFDNA QL: NOT DETECTED
FET CHR 11Q23 DEL PLAS.CFDNA QL: NOT DETECTED
FET CHR 15Q11 DEL PLAS.CFDNA QL: NOT DETECTED
FET CHR 21 TS PLAS.CFDNA QL: NEGATIVE
FET CHR 4P16 DEL PLAS.CFDNA QL: NOT DETECTED
FET CHR 8Q24 DEL PLAS.CFDNA QL: NOT DETECTED
FET MS X RISK WBC.DNA+CFDNA QL: NOT DETECTED
FET SEX PLAS.CFDNA DOSAGE CFDNA: NORMAL
FET TS 13 RISK PLAS.CFDNA QL: NEGATIVE
FET TS 18 RISK WBC.DNA+CFDNA QL: NEGATIVE
FET X + Y ANEUP RISK PLAS.CFDNA SEQ-IMP: NOT DETECTED
GA EST FROM CONCEPTION DATE: NORMAL D
GESTATIONAL AGE > 9:: YES
HBV SURFACE AG SERPL QL IA: NEGATIVE
HCT VFR BLD AUTO: 38.5 % (ref 34–46.6)
HCV IGG SERPL QL IA: NON REACTIVE
HGB BLD-MCNC: 13 G/DL (ref 11.1–15.9)
HIV 1+2 AB+HIV1 P24 AG SERPL QL IA: NON REACTIVE
IMM GRANULOCYTES # BLD AUTO: 0 X10E3/UL (ref 0–0.1)
IMM GRANULOCYTES NFR BLD AUTO: 0 %
LAB DIRECTOR NAME PROVIDER: NORMAL
LAB DIRECTOR NAME PROVIDER: NORMAL
LABORATORY COMMENT REPORT: NORMAL
LIMITATIONS OF THE TEST: NORMAL
LYMPHOCYTES # BLD AUTO: 1.8 X10E3/UL (ref 0.7–3.1)
LYMPHOCYTES NFR BLD AUTO: 28 %
MCH RBC QN AUTO: 31.3 PG (ref 26.6–33)
MCHC RBC AUTO-ENTMCNC: 33.8 G/DL (ref 31.5–35.7)
MCV RBC AUTO: 93 FL (ref 79–97)
MONOCYTES # BLD AUTO: 0.3 X10E3/UL (ref 0.1–0.9)
MONOCYTES NFR BLD AUTO: 5 %
NEGATIVE PREDICTIVE VALUE: NORMAL
NEUTROPHILS # BLD AUTO: 4.1 X10E3/UL (ref 1.4–7)
NEUTROPHILS NFR BLD AUTO: 65 %
NOTE: NORMAL
PERFORMANCE CHARACTERISTICS: NORMAL
PLATELET # BLD AUTO: 222 X10E3/UL (ref 150–450)
POSITIVE PREDICTIVE VALUE: NORMAL
RBC # BLD AUTO: 4.15 X10E6/UL (ref 3.77–5.28)
REF LAB TEST METHOD: NORMAL
RH BLD: POSITIVE
RPR SER QL: NON REACTIVE
RUBV IGG SERPL IA-ACNC: 1.12 INDEX
TEST PERFORMANCE INFO SPEC: NORMAL
TRIOSOMY 16: NOT DETECTED
TRISOMY 22: NOT DETECTED
WBC # BLD AUTO: 6.3 X10E3/UL (ref 3.4–10.8)

## 2023-09-18 ENCOUNTER — ROUTINE PRENATAL (OUTPATIENT)
Dept: OBSTETRICS AND GYNECOLOGY | Facility: CLINIC | Age: 28
End: 2023-09-18
Payer: COMMERCIAL

## 2023-09-18 ENCOUNTER — TELEPHONE (OUTPATIENT)
Dept: ONCOLOGY | Facility: CLINIC | Age: 28
End: 2023-09-18

## 2023-09-18 VITALS — SYSTOLIC BLOOD PRESSURE: 100 MMHG | BODY MASS INDEX: 21.47 KG/M2 | WEIGHT: 133 LBS | DIASTOLIC BLOOD PRESSURE: 80 MMHG

## 2023-09-18 DIAGNOSIS — O46.90 VAGINAL BLEEDING IN PREGNANCY: Primary | ICD-10-CM

## 2023-09-18 LAB
LEUKOCYTE EST, POC: ABNORMAL
RBC # UR STRIP: ABNORMAL /UL

## 2023-09-18 NOTE — TELEPHONE ENCOUNTER
Hub staff attempted to follow warm transfer process and was unsuccessful     Caller: Violeta Chin    Relationship to patient: Self    Best call back number: [phone] - LVM    Patient is needing: PT IS APPROX 10W 3D & WAS SEEN ON 09/13/23 - PT WAS BLEEDING YESTERDAY (09/17/23) - NO CRAMPING - PT IS ONLY SPOTTING TODAY - PT IS NEEDING TO KNOW IF SHE NEEDS TO BE SEEN OR IF EVERYTHING IS OK     PLEASE CALL THE PT TO DISCUSS    THANK YOU!

## 2023-09-18 NOTE — PROGRESS NOTES
OB FOLLOW UP  CC- Here for care of pregnancy        Violeta Chin is a 28 y.o.  10w3d patient being seen today for her obstetrical follow up visit. Patient reports vaginal bleeding. She had IC Sat am and noticed bleeding Sun PM around 1030 and that developed into a light flow of pink/red followed by persistent spotting. She denies pain.    Her prenatal care is complicated by (and status) : None  Patient Active Problem List   Diagnosis    Pregnancy       Desires genetic testing?: Yes with Gender  Flu Status: Declines  Ultrasound Today: No    ROS -   Patient Reports : Vaginal Spotting  Patient Denies: Loss of Fluid, Vision Changes, Headaches, Nausea , and Vomiting   Fetal Movement :  to early  All other systems reviewed and are negative.     The additional following portions of the patient's history were reviewed and updated as appropriate: allergies and current medications.    I have reviewed and agree with the HPI, ROS, and historical information as entered above. Kylah Villegas, APRN          /80   Wt 60.3 kg (133 lb)   LMP 2023   BMI 21.47 kg/m²         EXAM:     Prenatal Vitals  BP: 100/80  Weight: 60.3 kg (133 lb)   Fetal Heart Rate: 173                  Assessment and Plan    Problem List Items Addressed This Visit    None  Visit Diagnoses       Vaginal bleeding in pregnancy    -  Primary    Relevant Orders    POC Urinalysis Dipstick (Completed)    Urine Culture - Urine, Urine, Clean Catch          + FHR with doppler. Bleeding is now minimal/spotting. She is O+  CCUA- trace leuks and 2+ blood. Culture sent    Pregnancy at 10w3d  Labs reviewed from New OB Visit.  Activity and Exercise discussed. Advised pelvic rest for now.  Return for Next scheduled follow up.  Call if bleeding increases or she develops pain    Kylah Villegas, APRN  2023

## 2023-09-20 LAB
BACTERIA UR CULT: NORMAL
BACTERIA UR CULT: NORMAL

## 2023-10-06 ENCOUNTER — TELEPHONE (OUTPATIENT)
Dept: OBSTETRICS AND GYNECOLOGY | Facility: CLINIC | Age: 28
End: 2023-10-06
Payer: COMMERCIAL

## 2023-10-06 NOTE — TELEPHONE ENCOUNTER
I have advised the patient if her symptoms worsen she needs to go to the ER. I have advised the patient she can take some plain Robitussin and Mucinex for the cough, for nasal co ngestion saline spray, fever and bodyaches plain Tylenol. She Also needs to as 50mg Zinc, 500mg vitamin C and 1000mg zinc and a baby aspirin. Patient v/u. She tested positive with a at home covid test right now her symptoms are mild and more like cold symptoms.

## 2023-10-06 NOTE — TELEPHONE ENCOUNTER
Caller: Violeta Chin    Relationship to patient: Self    Best call back number: [phone]     Patient is needing: PATIENT TESTED POSITIVE FOR COVID THIS MORNING - PATIENT HAS BEEN EXPERIENCING SINUS PRESSURE, BODY ACHES, AND SOME COUGHING FOR ABOUT 3 TO 4 DAYS - PATIENT DOES NOT HAVE A FEVER - PATIENT IS ABOUT 13 WEEKS PREGNANT AND WOULD LIKE TO KNOW WHAT SHE SHOULD DO IF SYMPTOMS WERE TO WORSEN

## 2023-10-18 ENCOUNTER — ROUTINE PRENATAL (OUTPATIENT)
Dept: OBSTETRICS AND GYNECOLOGY | Facility: CLINIC | Age: 28
End: 2023-10-18
Payer: COMMERCIAL

## 2023-10-18 VITALS — BODY MASS INDEX: 22.27 KG/M2 | WEIGHT: 138 LBS | SYSTOLIC BLOOD PRESSURE: 112 MMHG | DIASTOLIC BLOOD PRESSURE: 70 MMHG

## 2023-10-18 DIAGNOSIS — Z3A.14 14 WEEKS GESTATION OF PREGNANCY: ICD-10-CM

## 2023-10-18 DIAGNOSIS — Z34.92 PRENATAL CARE IN SECOND TRIMESTER: Primary | ICD-10-CM

## 2023-10-18 LAB
EXPIRATION DATE: ABNORMAL
GLUCOSE UR STRIP-MCNC: NEGATIVE MG/DL
Lab: ABNORMAL
PROT UR STRIP-MCNC: ABNORMAL MG/DL

## 2023-10-18 NOTE — PROGRESS NOTES
OB FOLLOW UP  CC- Here for care of pregnancy        Violeta Chin is a 28 y.o.  14w5d patient being seen today for her obstetrical follow up visit. Patient reports right low back pain that she feels may be related to sciatica.  She also reports a tight, stretching sensation in her lower abdomen.      NOB labs reviewed- normal    Her prenatal care is complicated by (and status) :   Patient Active Problem List   Diagnosis    Pregnancy       Desires genetic testing?:  completed- normal  Flu Status: Will give in office today  Ultrasound Today: No    ROS -   Patient Reports :  back pain  Patient Denies: Vaginal Spotting and cramping  Fetal Movement :  absent- too early   All other systems reviewed and are negative.     The additional following portions of the patient's history were reviewed and updated as appropriate: allergies, current medications, past family history, past medical history, past social history, past surgical history, and problem list.    I have reviewed and agree with the HPI, ROS, and historical information as entered above. Mary Crabtree MD          /70   Wt 62.6 kg (138 lb)   LMP 2023   BMI 22.27 kg/m²         EXAM:     Prenatal Vitals  BP: 112/70  Weight: 62.6 kg (138 lb)   Fetal Heart Rate: +          Urine Glucose Read-only: Negative  Urine Protein Read-only: (!) Trace       Assessment and Plan    Problem List Items Addressed This Visit       Pregnancy    Overview     cfDNA low risk          Other Visit Diagnoses       Prenatal care in second trimester    -  Primary    Relevant Orders    POC Glucose, Urine, Qualitative, Dipstick (Completed)    POC Protein, Urine, Qualitative, Dipstick (Completed)    US Ob 14 + Weeks Single or First Gestation            Pregnancy at 14w5d  Labs reviewed from New OB Visit.  Counseled on genetic testing, carrier status and option for NT screen- done and low risk  Activity and Exercise discussed.  Patient is on Prenatal vitamins  Return  in about 5 weeks (around 11/22/2023) for F/U Prenatal, U/S Next Visit.    Mary Crabtree MD  10/18/2023

## 2023-11-22 ENCOUNTER — ROUTINE PRENATAL (OUTPATIENT)
Dept: OBSTETRICS AND GYNECOLOGY | Facility: CLINIC | Age: 28
End: 2023-11-22
Payer: COMMERCIAL

## 2023-11-22 VITALS — BODY MASS INDEX: 22.6 KG/M2 | SYSTOLIC BLOOD PRESSURE: 110 MMHG | DIASTOLIC BLOOD PRESSURE: 60 MMHG | WEIGHT: 140 LBS

## 2023-11-22 DIAGNOSIS — Z3A.19 19 WEEKS GESTATION OF PREGNANCY: Primary | ICD-10-CM

## 2023-11-22 LAB
GLUCOSE UR STRIP-MCNC: NEGATIVE MG/DL
PROT UR STRIP-MCNC: ABNORMAL MG/DL

## 2023-11-22 NOTE — PROGRESS NOTES
OB FOLLOW UP  CC- Here for care of pregnancy        Violeta Chin is a 28 y.o.  19w5d patient being seen today for her obstetrical follow up visit. Patient reports no complaints..     Her prenatal care is complicated by (and status) : None  Patient Active Problem List   Diagnosis    Pregnancy       Flu Status: Already given in current flu season  Ultrasound Today: Yes    ROS -   Patient Reports : No Problems  Patient Denies: Loss of Fluid, Vaginal Spotting, Vision Changes, Headaches, Nausea , Vomiting , and Contractions  Fetal Movement : normal  All other systems reviewed and are negative.       The additional following portions of the patient's history were reviewed and updated as appropriate: allergies and current medications.      I have reviewed and agree with the HPI, ROS, and historical information as entered above. Mary Crabtree MD      /60   Wt 63.5 kg (140 lb)   LMP 2023   BMI 22.60 kg/m²       EXAM:     Prenatal Vitals  BP: 110/60  Weight: 63.5 kg (140 lb)   Fetal Heart Rate: +          Urine Glucose Read-only: Negative  Urine Protein Read-only: (!) 1+       Assessment and Plan    Problem List Items Addressed This Visit       Pregnancy - Primary    Overview     cfDNA low risk         Relevant Orders    POC Urinalysis Dipstick (Completed)       Pregnancy at 19w5d  Anatomy scan today is complete and appear within normal limits.  Fetal status reassuring.   Activity and Exercise discussed.  Patient is on Prenatal vitamins  Return in about 1 month (around 2023) for F/U Prenatal.    Mary Crabtree MD  2023

## 2023-12-22 ENCOUNTER — ROUTINE PRENATAL (OUTPATIENT)
Dept: OBSTETRICS AND GYNECOLOGY | Facility: CLINIC | Age: 28
End: 2023-12-22
Payer: COMMERCIAL

## 2023-12-22 VITALS — WEIGHT: 145.4 LBS | DIASTOLIC BLOOD PRESSURE: 60 MMHG | BODY MASS INDEX: 23.47 KG/M2 | SYSTOLIC BLOOD PRESSURE: 102 MMHG

## 2023-12-22 DIAGNOSIS — Z34.92 PRENATAL CARE IN SECOND TRIMESTER: Primary | ICD-10-CM

## 2023-12-22 LAB
GLUCOSE UR STRIP-MCNC: NEGATIVE MG/DL
PROT UR STRIP-MCNC: NEGATIVE MG/DL

## 2023-12-22 NOTE — PROGRESS NOTES
OB FOLLOW UP  CC- Here for care of pregnancy        Violeta Chin is a 28 y.o.  24w0d patient being seen today for her obstetrical follow up visit. Patient reports swelling in both lower extremities that only occurred when she was flying to [place]. It has since resolved.     Her prenatal care is complicated by (and status) : None  Patient Active Problem List   Diagnosis    Pregnancy       Flu Status: Already given in current flu season  Ultrasound Today: No    ROS -   Patient Reports :  See above  Patient Denies: Loss of Fluid, Vaginal Spotting, Vision Changes, Headaches, Nausea , and Vomiting   Fetal Movement : normal  All other systems reviewed and are negative.       The additional following portions of the patient's history were reviewed and updated as appropriate: allergies, current medications, past family history, past medical history, past social history, past surgical history, and problem list.      I have reviewed and agree with the HPI, ROS, and historical information as entered above. KELIN Sorenson      /60   Wt 66 kg (145 lb 6.4 oz)   LMP 2023   BMI 23.47 kg/m²       EXAM:     Prenatal Vitals  BP: 102/60  Weight: 66 kg (145 lb 6.4 oz)   Fetal Heart Rate: 140               Urine Glucose Read-only: Negative  Urine Protein Read-only: Negative       Assessment and Plan    Problem List Items Addressed This Visit    None  Visit Diagnoses       Prenatal care in second trimester    -  Primary    Relevant Orders    POC Urinalysis Dipstick (Completed)            Pregnancy at 24w0d  Fetal status reassuring.  1 hour gtt, CBC, Antibody screen, and TDAP next visit. Instructions given  Discussed/encouraged TDAP vaccination after 28 weeks  Reviewed Pre-eclampsia signs/symptoms  Activity and Exercise discussed.  Return in about 4 weeks (around 2024) for Bro BROWN.      KELIN Sorenson  2023

## 2024-01-05 ENCOUNTER — OFFICE VISIT (OUTPATIENT)
Dept: FAMILY MEDICINE CLINIC | Facility: CLINIC | Age: 29
End: 2024-01-05
Payer: COMMERCIAL

## 2024-01-05 VITALS
RESPIRATION RATE: 16 BRPM | OXYGEN SATURATION: 99 % | HEIGHT: 66 IN | HEART RATE: 66 BPM | TEMPERATURE: 97.5 F | SYSTOLIC BLOOD PRESSURE: 110 MMHG | DIASTOLIC BLOOD PRESSURE: 62 MMHG | BODY MASS INDEX: 24.11 KG/M2 | WEIGHT: 150 LBS

## 2024-01-05 DIAGNOSIS — Z3A.26 26 WEEKS GESTATION OF PREGNANCY: ICD-10-CM

## 2024-01-05 DIAGNOSIS — M54.50 ACUTE RIGHT-SIDED LOW BACK PAIN WITHOUT SCIATICA: ICD-10-CM

## 2024-01-05 DIAGNOSIS — Z00.00 ANNUAL PHYSICAL EXAM: Primary | ICD-10-CM

## 2024-01-05 PROCEDURE — 99395 PREV VISIT EST AGE 18-39: CPT | Performed by: FAMILY MEDICINE

## 2024-01-05 NOTE — PROGRESS NOTES
Chief Complaint  Annual Exam and Back Pain    Subjective          Violeta Chin presents to [facility]    Pap smear: July 2022, pelvic exam August 2023  Vision exam: overdue  Dental exam: up to date    She exercises 4-5 times each week.    She has low back pain, right sided   Denies radiation of pain into lower extremities  No specific injury   She hasn't applied heat or ice. She has tried stretches   She is currently 26 weeks pregnant.     The following portions of the patient's history were reviewed and updated as appropriate: allergies, current medications, past family history, past medical history, past social history, past surgical history, and problem list.    Objective      Physical Exam  Vitals reviewed.   Constitutional:       Appearance: Normal appearance.   HENT:      Right Ear: Tympanic membrane, ear canal and external ear normal.      Left Ear: Tympanic membrane, ear canal and external ear normal.      Mouth/Throat:      Mouth: Mucous membranes are moist.      Pharynx: Oropharynx is clear. No oropharyngeal exudate.   Cardiovascular:      Rate and Rhythm: Normal rate.      Heart sounds: Normal heart sounds.   Pulmonary:      Effort: Pulmonary effort is normal.      Breath sounds: Normal breath sounds.   Musculoskeletal:      Cervical back: Neck supple.      Lumbar back: Tenderness (right SI joint) present.   Neurological:      Mental Status: She is alert.   Psychiatric:         Behavior: Behavior normal.        Result Review :                Assessment and Plan    Diagnoses and all orders for this visit:    1. Annual physical exam (Primary)    2. Acute right-sided low back pain without sciatica  Comments:  At home exercises provided. Consider PT if symptoms worsen.    3. 26 weeks gestation of pregnancy    Health advice: healthy food choices with fresh fruits and vegetables, maintain sleep pattern at least 8 hours, avoid texting and distracted driving practices; wear  safety belt, engage in regular exercise, maintain healthy weight.   Maintain immunizations that are up to date. Maintain health maintenance: Pap, etc.  Follow up with PCP if struggling with depression or anxiety. Keep regular dental and eye exams. Brush and floss teeth daily.   F/U annually and prn.       Follow Up   Return if symptoms worsen or fail to improve.  Patient was given instructions and counseling regarding her condition or for health maintenance advice. Please see specific information pulled into the AVS if appropriate.

## 2024-04-20 ENCOUNTER — READMISSION MANAGEMENT (OUTPATIENT)
Dept: CALL CENTER | Facility: HOSPITAL | Age: 29
End: 2024-04-20
Payer: COMMERCIAL

## 2024-04-20 NOTE — OUTREACH NOTE
Prep Survey      Flowsheet Row Responses   [facility] facility patient discharged from? Non-BH   Is LACE score < 7 ? Non-BH Discharge   Eligibility Not Eligible   What are the reasons patient is not eligible? Other  [Pregnancy]   Does the patient have one of the following disease processes/diagnoses(primary or secondary)? Other   Prep survey completed? Yes            HENNY A - Registered Nurse